# Patient Record
Sex: MALE | URBAN - METROPOLITAN AREA
[De-identification: names, ages, dates, MRNs, and addresses within clinical notes are randomized per-mention and may not be internally consistent; named-entity substitution may affect disease eponyms.]

---

## 2017-08-04 ENCOUNTER — TRANSFERRED RECORDS (OUTPATIENT)
Dept: HEALTH INFORMATION MANAGEMENT | Facility: CLINIC | Age: 77
End: 2017-08-04

## 2017-11-28 ENCOUNTER — TRANSFERRED RECORDS (OUTPATIENT)
Dept: HEALTH INFORMATION MANAGEMENT | Facility: CLINIC | Age: 77
End: 2017-11-28

## 2017-12-04 ENCOUNTER — ONCOLOGY VISIT (OUTPATIENT)
Dept: ONCOLOGY | Facility: CLINIC | Age: 77
End: 2017-12-04
Attending: INTERNAL MEDICINE
Payer: MEDICARE

## 2017-12-04 VITALS
HEART RATE: 62 BPM | RESPIRATION RATE: 16 BRPM | OXYGEN SATURATION: 97 % | DIASTOLIC BLOOD PRESSURE: 73 MMHG | SYSTOLIC BLOOD PRESSURE: 157 MMHG | WEIGHT: 213.6 LBS

## 2017-12-04 DIAGNOSIS — C91.10 CHRONIC LYMPHOCYTIC LEUKEMIA (H): Primary | ICD-10-CM

## 2017-12-04 PROBLEM — J32.8 OTHER CHRONIC SINUSITIS: Status: ACTIVE | Noted: 2017-12-04

## 2017-12-04 PROBLEM — D80.1 HYPOGAMMAGLOBULINEMIA (H): Status: ACTIVE | Noted: 2017-12-04

## 2017-12-04 PROCEDURE — 99211 OFF/OP EST MAY X REQ PHY/QHP: CPT

## 2017-12-04 PROCEDURE — 99214 OFFICE O/P EST MOD 30 MIN: CPT | Performed by: INTERNAL MEDICINE

## 2017-12-04 RX ORDER — FLUTICASONE PROPIONATE 50 MCG
1 SPRAY, SUSPENSION (ML) NASAL DAILY
COMMUNITY

## 2017-12-04 RX ORDER — CYCLOSPORINE 0.5 MG/ML
1 EMULSION OPHTHALMIC
COMMUNITY

## 2017-12-04 RX ORDER — CALCIUM CARBONATE 500(1250)
1 TABLET ORAL 2 TIMES DAILY
COMMUNITY

## 2017-12-04 ASSESSMENT — PAIN SCALES - GENERAL: PAINLEVEL: NO PAIN (0)

## 2017-12-04 NOTE — PATIENT INSTRUCTIONS
Continue ibrutinib 280 mg a day.  IVIG every 3 months at [facility].  Monthly CBC in local clinic.  Follow up in 6 months.

## 2017-12-04 NOTE — MR AVS SNAPSHOT
"              After Visit Summary   2017    Zi Collins    MRN: [id number]           Patient Information     Date Of Birth          1940        Visit Information        Provider Department      2017 9:30 AM Cornell Bledsoe MD [facility]        Today's Diagnoses     Chronic lymphocytic leukemia (H)    -  1      Care Instructions    Continue ibrutinib 280 mg a day.  IVIG every 3 months at [facility].  Monthly CBC in local clinic.  Follow up in 6 months.          Follow-ups after your visit        Who to contact     If you have questions or need follow up information about today's clinic visit or your schedule please contact [facility] [place] directly at [phone].  Normal or non-critical lab and imaging results will be communicated to you by Ideal Mehart, letter or phone within 4 business days after the clinic has received the results. If you do not hear from us within 7 days, please contact the clinic through Ideal Mehart or phone. If you have a critical or abnormal lab result, we will notify you by phone as soon as possible.  Submit refill requests through Pictela or call your pharmacy and they will forward the refill request to us. Please allow 3 business days for your refill to be completed.          Additional Information About Your Visit        MyChart Information     Pictela lets you send messages to your doctor, view your test results, renew your prescriptions, schedule appointments and more. To sign up, go to [email or web address] . Click on \"Log in\" on the left side of the screen, which will take you to the Welcome page. Then click on \"Sign up Now\" on the right side of the page.     You will be asked to enter the access code listed below, as well as some personal information. Please follow the directions to create your username and password.     Your access code is: [email or web address]  Expires: 3/11/2018  4:03 PM     Your access code will  in 90 days. If you " need help or a new code, please call your [place] clinic or [phone].        Care EveryWhere ID     This is your Care EveryWhere ID. This could be used by other organizations to access your [place] medical records  [id number]        Your Vitals Were     Pulse Respirations Pulse Oximetry             62 16 97%          Blood Pressure from Last 3 Encounters:   12/04/17 157/73    Weight from Last 3 Encounters:   12/04/17 96.9 kg (213 lb 9.6 oz)              Today, you had the following     No orders found for display         Today's Medication Changes          These changes are accurate as of: 12/4/17 11:59 PM.  If you have any questions, ask your nurse or doctor.               These medicines have changed or have updated prescriptions.        Dose/Directions    ibrutinib 140 MG capsule   Commonly known as:  IMBRUVICA   Indication:  2 tabs po qam   This may have changed:    - medication strength  - how much to take   Used for:  Chronic lymphocytic leukemia (H)   Changed by:  Cornell Bledsoe MD        Dose:  280 mg   Take 2 capsules (280 mg) by mouth daily   Quantity:  60 capsule   Refills:  6            Where to get your medicines      Some of these will need a paper prescription and others can be bought over the counter.  Ask your nurse if you have questions.     Bring a paper prescription for each of these medications     ibrutinib 140 MG capsule                Primary Care Provider    None Specified       No primary provider on file.        Equal Access to Services     ALEXUS RODRIGUEZ : Tosha Chambers, wayvette haddad, qaybta kalyubov damon. So [facility] [phone].    ATENCIÓN: Si habla español, tiene a henry disposición servicios gratuitos de asistencia lingüística. Llame al [phone].    We comply with applicable federal civil rights laws and [place] laws. We do not discriminate on the basis of race, color, national origin, age, disability, sex,  sexual orientation, or gender identity.            Thank you!     Thank you for choosing [facility]  for your care. Our goal is always to provide you with excellent care. Hearing back from our patients is one way we can continue to improve our services. Please take a few minutes to complete the written survey that you may receive in the mail after your visit with us. Thank you!             Your Updated Medication List - Protect others around you: Learn how to safely use, store and throw away your medicines at www.disposemymeds.org.          This list is accurate as of: 12/4/17 11:59 PM.  Always use your most recent med list.                   Brand Name Dispense Instructions for use Diagnosis    ASPIRIN PO      Take 81 mg by mouth daily    Chronic lymphocytic leukemia (H)       calcium carbonate 1250 MG tablet    OS-CAROLINE 500 mg [place]. Ca     Take 1 tablet by mouth 2 times daily    Chronic lymphocytic leukemia (H)       cycloSPORINE 0.05 % ophthalmic emulsion    RESTASIS     1 drop    Chronic lymphocytic leukemia (H)       FISH OIL + D3 PO      Take by mouth 3 times daily    Chronic lymphocytic leukemia (H)       fluticasone 50 MCG/ACT spray    FLONASE     Spray 1 spray into both nostrils daily    Chronic lymphocytic leukemia (H)       hydroxypropyl methylcellulose 0.2 % Soln ophthalmic solution    GENTEAL     4 times daily if needed. Instill 1 drop right eye two times a day    Chronic lymphocytic leukemia (H)       ibrutinib 140 MG capsule    IMBRUVICA    60 capsule    Take 2 capsules (280 mg) by mouth daily    Chronic lymphocytic leukemia (H)       IRON PO       Chronic lymphocytic leukemia (H)

## 2017-12-04 NOTE — LETTER
12/4/2017         RE: Zi Collins  [address] [place] [facility] [address]        Dear Colleague,    Thank you for referring your patient, Zi Collins, to the [facility]. Please see a copy of my visit note below.    Oncology Rooming Note    December 4, 2017 10:00 AM   Zi Collins is a 77 year old male who presents for:    Chief Complaint   Patient presents with     Oncology Clinic Visit     CLL     Initial Vitals: /73 (BP Location: Left arm, Patient Position: Sitting, Cuff Size: Adult Large)  Pulse 62  Resp 16  Wt 96.9 kg (213 lb 9.6 oz)  SpO2 97% There is no height or weight on file to calculate BMI. There is no height or weight on file to calculate BSA.  No Pain (0) Comment: Data Unavailable   No LMP for male patient.  Allergies reviewed: Yes  Medications reviewed: Yes    Medications: Medication refills not needed today.  Pharmacy name entered into EPIC: Data Unavailable    Clinical concerns: None            4 minutes for nursing intake (face to face time)     China Aguero MA                HEMATOLOGIC HISTORY: Mr. Collins is a gentleman with CLL.  1. On 03/06/2007 white count of 14.3, hemoglobin of 15.1, platelet 142. Lymphocytes of 77%.  2. Flow cytometry in 2007 revealed lambda light chain restricted B-cells. It was positive for CD5, CD23, CD19 and CD20.   3. CT of the chest, abdomen and pelvis on 10/18/2011 revealed stable lung nodules. There was an increase in size of mesenteric lymph nodes. (CT was compared to previous CT scan from 2010.)   4. PET scan on 11/30/2011 revealed splenomegaly.  5. Bone marrow biopsy on 11/30/2011 revealed 80% involvement by CLL. FISH panel for CLL was normal.   6. On 01/09/2014, white count was 159, hemoglobin of 9.6 and platelet of 146.  8. Patient received six cycles of bendamustine and Rituximab between 02/06/2014 and 07/02/2014.   9. CT scan of neck and sinus was done on 11/14/2016. There is sinusitis. There is  increase in size and number of enlarged lymph node in neck.  10. Ibrutinib started in 1st week of December 2016. He was initially on 420 mg a day. Dose was decreased to 280 mg a day due to side effects.     SUBJECTIVE:    Mr. Collins is a 77-year-old gentleman who is well known to me from [facility].  He was diagnosed with CLL in 2007.  He was initially observed.  He needed treatment in 2014.  He received 6 cycles of bendamustine and rituximab between 02/2014 and 07/2014.  He did well until 2016.  Because of progression of CLL, he was started on ibrutinib in the first week of December,2016.  He was initially 420 mg a day.  Due to side effects, it was reduced to 280 mg a day.  He is tolerating it well.      Patient also was getting frequent infections.  He was found to have a low IgG level.  For hypogammaglobulinemia, he was started on IVIG.  Now he is getting it every 3 months.  With that his infection rate has decreased.      Patient overall is doing well from CLL.  He has some fatigue which would go along with his age.  No worsening of fatigue.  He has not had any significant infections recently.      REVIEW OF SYSTEMS:    He denies any headache.  No dizziness.  He has some eye problems.  He follows up with the ophthalmologist.  No chest pain or difficulty breathing.  No abdominal pain, nausea or vomiting.  Appetite has been good.  No recent weight loss.  No fever, chills or night sweats.     He has chronic sinusitis. He follows with ENT.     PHYSICAL EXAMINATION:   GENERAL:  He is alert and oriented x 3.   VITAL SIGNS:  Reviewed.   EYES:  No icterus.   THROAT:  No ulcer or thrush.   NECK:  Supple. No lymphadenopathy or thyromegaly.   AXILLAE:  No lymphadenopathy.   LUNGS:  Good air entry bilaterally.  No crackles or wheezing.   HEART:  Regular.   ABDOMEN:  Soft and nontender.  No mass.   EXTREMITIES:  No edema.  No calf swelling or tenderness.   SKIN:  No rash.      LABORATORY DATA:  These labs were  done at [facility] in [place], [place], on 2017.     -WBC 7.5, hemoglobin 15.5 and platelets of 98,000.  Neutrophils of 46% and lymphocytes of 46%.   -CMP normal.   -IgG level is normal.      ASSESSMENT:   1.  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  CLL is stable.   2.  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  He is on IVIG every 3 months.   3.  Chronic sinusitis which is stable.     4.  Chronic thrombocytopenia which is stable.      PLAN:   1. Patient overall is stable from CLL.  CBC was reviewed.  WBC and hemoglobin is normal.  His thrombocytopenia is stable. He is on ibrutinib 280 mg a day.  He is tolerating it well.  He will continue on it.  Prescriptions refilled.  Side effect of ibrutinib revealed.   2. Patient has hypogammaglobulinemia.  He has chronic sinusitis.  He used to get frequent URI.  He is on IVIG every 3 months.  With that, his IgG level is normal.  His infection has decreased.  He will continue on IVIG every 3 months.   3. Patient has chronic sinusitis.  He follows up with ENT.   4. Patient lives about an hour away from here.  He will have CBC checked once a month in local clinic and fax us the results.  CMP will be done every 3 months.  Patient advised to see a physician if he has fever, chills, infection, worsening weakness, shortness of breath, bleeding or any other concerns.  He will follow up with me in 6 months.  He and his wife had a few questions, which were all answered.         ADDIE MELTON MD             D: 2017 11:56   T: 2017 22:11   MT: LQ      Name:     JULIA WHITTINGTON   MRN:      [id number]        Account:      [id number]   :      1940           Visit Date:   2017      Document: [id number]          Again, thank you for allowing me to participate in the care of your patient.        Sincerely,        Addie Melton MD

## 2017-12-04 NOTE — PROGRESS NOTES
Oncology Rooming Note    December 4, 2017 10:00 AM   Zi Collins is a 77 year old male who presents for:    Chief Complaint   Patient presents with     Oncology Clinic Visit     CLL     Initial Vitals: /73 (BP Location: Left arm, Patient Position: Sitting, Cuff Size: Adult Large)  Pulse 62  Resp 16  Wt 96.9 kg (213 lb 9.6 oz)  SpO2 97% There is no height or weight on file to calculate BMI. There is no height or weight on file to calculate BSA.  No Pain (0) Comment: Data Unavailable   No LMP for male patient.  Allergies reviewed: Yes  Medications reviewed: Yes    Medications: Medication refills not needed today.  Pharmacy name entered into EPIC: Data Unavailable    Clinical concerns: None            4 minutes for nursing intake (face to face time)     China Aguero MA

## 2017-12-05 NOTE — PROGRESS NOTES
HEMATOLOGIC HISTORY: Mr. Collins is a gentleman with CLL.  1. On 03/06/2007 white count of 14.3, hemoglobin of 15.1, platelet 142. Lymphocytes of 77%.  2. Flow cytometry in 2007 revealed lambda light chain restricted B-cells. It was positive for CD5, CD23, CD19 and CD20.   3. CT of the chest, abdomen and pelvis on 10/18/2011 revealed stable lung nodules. There was an increase in size of mesenteric lymph nodes. (CT was compared to previous CT scan from 2010.)   4. PET scan on 11/30/2011 revealed splenomegaly.  5. Bone marrow biopsy on 11/30/2011 revealed 80% involvement by CLL. FISH panel for CLL was normal.   6. On 01/09/2014, white count was 159, hemoglobin of 9.6 and platelet of 146.  8. Patient received six cycles of bendamustine and Rituximab between 02/06/2014 and 07/02/2014.   9. CT scan of neck and sinus was done on 11/14/2016. There is sinusitis. There is increase in size and number of enlarged lymph node in neck.  10. Ibrutinib started in 1st week of December 2016. He was initially on 420 mg a day. Dose was decreased to 280 mg a day due to side effects.     SUBJECTIVE:    Mr. Collins is a 77-year-old gentleman who is well known to me from [facility].  He was diagnosed with CLL in 2007.  He was initially observed.  He needed treatment in 2014.  He received 6 cycles of bendamustine and rituximab between 02/2014 and 07/2014.  He did well until 2016.  Because of progression of CLL, he was started on ibrutinib in the first week of December,2016.  He was initially 420 mg a day.  Due to side effects, it was reduced to 280 mg a day.  He is tolerating it well.      Patient also was getting frequent infections.  He was found to have a low IgG level.  For hypogammaglobulinemia, he was started on IVIG.  Now he is getting it every 3 months.  With that his infection rate has decreased.      Patient overall is doing well from CLL.  He has some fatigue which would go along with his age.  No worsening of  fatigue.  He has not had any significant infections recently.      REVIEW OF SYSTEMS:    He denies any headache.  No dizziness.  He has some eye problems.  He follows up with the ophthalmologist.  No chest pain or difficulty breathing.  No abdominal pain, nausea or vomiting.  Appetite has been good.  No recent weight loss.  No fever, chills or night sweats.     He has chronic sinusitis. He follows with ENT.     PHYSICAL EXAMINATION:   GENERAL:  He is alert and oriented x 3.   VITAL SIGNS:  Reviewed.   EYES:  No icterus.   THROAT:  No ulcer or thrush.   NECK:  Supple. No lymphadenopathy or thyromegaly.   AXILLAE:  No lymphadenopathy.   LUNGS:  Good air entry bilaterally.  No crackles or wheezing.   HEART:  Regular.   ABDOMEN:  Soft and nontender.  No mass.   EXTREMITIES:  No edema.  No calf swelling or tenderness.   SKIN:  No rash.      LABORATORY DATA:  These labs were done at [facility] in [place], [place], on 11/28/2017.     -WBC 7.5, hemoglobin 15.5 and platelets of 98,000.  Neutrophils of 46% and lymphocytes of 46%.   -CMP normal.   -IgG level is normal.      ASSESSMENT:   1.  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  CLL is stable.   2.  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  He is on IVIG every 3 months.   3.  Chronic sinusitis which is stable.     4.  Chronic thrombocytopenia which is stable.      PLAN:   1. Patient overall is stable from CLL.  CBC was reviewed.  WBC and hemoglobin is normal.  His thrombocytopenia is stable. He is on ibrutinib 280 mg a day.  He is tolerating it well.  He will continue on it.  Prescriptions refilled.  Side effect of ibrutinib revealed.   2. Patient has hypogammaglobulinemia.  He has chronic sinusitis.  He used to get frequent URI.  He is on IVIG every 3 months.  With that, his IgG level is normal.  His infection has decreased.  He will continue on IVIG every 3 months.   3. Patient has chronic sinusitis.  He follows up with ENT.   4. Patient lives  about an hour away from here.  He will have CBC checked once a month in local clinic and fax us the results.  CMP will be done every 3 months.  Patient advised to see a physician if he has fever, chills, infection, worsening weakness, shortness of breath, bleeding or any other concerns.  He will follow up with me in 6 months.  He and his wife had a few questions, which were all answered.         ADDIE MELTON MD             D: 2017 11:56   T: 2017 22:11   MT: LQ      Name:     JULIA WHITTINGTON   MRN:      [id number]        Account:      [id number]   :      1940           Visit Date:   2017      Document: [id number]

## 2017-12-27 ENCOUNTER — TRANSFERRED RECORDS (OUTPATIENT)
Dept: HEALTH INFORMATION MANAGEMENT | Facility: CLINIC | Age: 77
End: 2017-12-27

## 2017-12-27 LAB
ALT SERPL-CCNC: 31 U/L (ref 14–63)
AST SERPL-CCNC: 19 U/L (ref 15–37)
CREAT SERPL-MCNC: 1.04 MG/DL (ref 0.67–1.17)
GFR SERPL CREATININE-BSD FRML MDRD: >60 ML/MIN/1.73M2
GLUCOSE SERPL-MCNC: 79 MG/DL (ref 74–100)
POTASSIUM SERPL-SCNC: 4.7 MMOL/L (ref 3.5–5.1)

## 2018-01-29 ENCOUNTER — TRANSFERRED RECORDS (OUTPATIENT)
Dept: HEALTH INFORMATION MANAGEMENT | Facility: CLINIC | Age: 78
End: 2018-01-29

## 2018-01-29 LAB
ALT SERPL-CCNC: 22 U/L (ref 14–63)
AST SERPL-CCNC: 19 U/L (ref 15–37)
CREAT SERPL-MCNC: 0.97 MG/DL (ref 0.67–1.17)
GFR SERPL CREATININE-BSD FRML MDRD: >60 ML/MIN/1.73M2 (ref 60–150)
GLUCOSE SERPL-MCNC: 71 MG/DL (ref 74–100)
POTASSIUM SERPL-SCNC: 4.4 MMOL/L (ref 3.5–5.1)

## 2018-03-08 ENCOUNTER — TELEPHONE (OUTPATIENT)
Dept: ONCOLOGY | Facility: CLINIC | Age: 78
End: 2018-03-08

## 2018-03-08 ENCOUNTER — DOCUMENTATION ONLY (OUTPATIENT)
Dept: PHARMACY | Facility: CLINIC | Age: 78
End: 2018-03-08

## 2018-03-08 NOTE — TELEPHONE ENCOUNTER
I received notification that patient passed away on 3/7/2018 and is being cremated Dr. Swann  Colleague to Dr. Bledsoe will fill out death certificate. Kalyani Edmonds

## 2018-03-08 NOTE — PROGRESS NOTES
Oral Chemotherapy Monitoring Program.    Pharmacy signing off on patient monitoring while no longer on oral chemotherapy. Will continue to follow as needed/appropriate. Thank you for the opportunity to participate in this patient's care.    Mora Castellon PharmD  March 8, 2018